# Patient Record
Sex: FEMALE | Race: WHITE | NOT HISPANIC OR LATINO | Employment: FULL TIME | URBAN - METROPOLITAN AREA
[De-identification: names, ages, dates, MRNs, and addresses within clinical notes are randomized per-mention and may not be internally consistent; named-entity substitution may affect disease eponyms.]

---

## 2018-02-15 ENCOUNTER — OFFICE VISIT (OUTPATIENT)
Dept: URGENT CARE | Facility: CLINIC | Age: 23
End: 2018-02-15
Payer: COMMERCIAL

## 2018-02-15 VITALS
HEIGHT: 63 IN | BODY MASS INDEX: 26.58 KG/M2 | OXYGEN SATURATION: 98 % | TEMPERATURE: 99 F | HEART RATE: 72 BPM | DIASTOLIC BLOOD PRESSURE: 72 MMHG | WEIGHT: 150 LBS | RESPIRATION RATE: 17 BRPM | SYSTOLIC BLOOD PRESSURE: 113 MMHG

## 2018-02-15 DIAGNOSIS — J10.1 INFLUENZA B: Primary | ICD-10-CM

## 2018-02-15 DIAGNOSIS — R68.89 FLU-LIKE SYMPTOMS: ICD-10-CM

## 2018-02-15 LAB
CTP QC/QA: YES
FLUAV AG NPH QL: NEGATIVE
FLUBV AG NPH QL: POSITIVE

## 2018-02-15 PROCEDURE — 99203 OFFICE O/P NEW LOW 30 MIN: CPT | Mod: S$GLB,,, | Performed by: NURSE PRACTITIONER

## 2018-02-15 PROCEDURE — 3008F BODY MASS INDEX DOCD: CPT | Mod: S$GLB,,, | Performed by: NURSE PRACTITIONER

## 2018-02-15 PROCEDURE — 87804 INFLUENZA ASSAY W/OPTIC: CPT | Mod: QW,S$GLB,, | Performed by: NURSE PRACTITIONER

## 2018-02-15 RX ORDER — OSELTAMIVIR PHOSPHATE 75 MG/1
75 CAPSULE ORAL 2 TIMES DAILY
Qty: 10 CAPSULE | Refills: 0 | Status: SHIPPED | OUTPATIENT
Start: 2018-02-15 | End: 2018-02-20

## 2018-02-15 NOTE — LETTER
February 15, 2018      [facility]  [facility] [address]  Phone: [phone]  Fax: [phone]       Patient: Hamlet Bear   YOB: 1995  Date of Visit: 02/15/2018    To Whom It May Concern:    Radha Bear  was at [facility] on 02/15/2018. She may return to work/school on 2/17/18 with no restrictions. If you have any questions or concerns, or if I can be of further assistance, please do not hesitate to contact me.    Sincerely,    Melvi Mabry NP

## 2018-02-15 NOTE — PROGRESS NOTES
"Subjective:       Patient ID: Hamlet Bear is a 22 y.o. female.    Vitals:  height is 5' 3" (1.6 m) and weight is 68 kg (150 lb). Her temperature is 99.1 °F (37.3 °C). Her blood pressure is 113/72 and her pulse is 72. Her respiration is 17 and oxygen saturation is 98%.     Chief Complaint: Fever (102.3 yesterday) and Cough (since yesterday)    Pt has had a fever since yesterday and took tylenol this morning to get her fever down 102.7 yesterday. Pt says she has body aches and chills along with congestion. Pt has been alternating tylenol and ibuprofen since yesterday. Pt is a local teacher. [employer]      Fever    This is a new problem. The current episode started yesterday. The problem occurs daily. The problem has been unchanged. The maximum temperature noted was 101 to 101.9 F. The temperature was taken using an oral thermometer. Associated symptoms include congestion, coughing, headaches, sleepiness and a sore throat. Pertinent negatives include no diarrhea, ear pain, nausea or vomiting. She has tried acetaminophen and NSAIDs for the symptoms. The treatment provided mild relief.   Risk factors: no contaminated food and no contaminated water    Cough   This is a new problem. The current episode started yesterday. The problem has been unchanged. The problem occurs every few minutes. The cough is productive of sputum. Associated symptoms include chills, a fever, headaches, myalgias, nasal congestion, postnasal drip and a sore throat. Pertinent negatives include no ear pain. She has tried nothing for the symptoms. The treatment provided no relief. There is no history of asthma, COPD or pneumonia.     Review of Systems   Constitution: Positive for chills, decreased appetite, fever and malaise/fatigue.   HENT: Positive for congestion, postnasal drip and sore throat. Negative for ear discharge and ear pain.    Respiratory: Positive for cough and sputum production.    Musculoskeletal: Positive for myalgias.   Gastrointestinal: " Negative for constipation, diarrhea, nausea and vomiting.   Neurological: Positive for headaches.       Objective:      Physical Exam   Constitutional: She is oriented to person, place, and time. She appears well-developed and well-nourished. She is cooperative.  Non-toxic appearance. She does not appear ill. No distress.   HENT:   Head: Normocephalic and atraumatic.   Right Ear: Hearing, tympanic membrane, external ear and ear canal normal.   Left Ear: Hearing, tympanic membrane, external ear and ear canal normal.   Nose: Mucosal edema and rhinorrhea present. No nasal deformity. No epistaxis. Right sinus exhibits no maxillary sinus tenderness and no frontal sinus tenderness. Left sinus exhibits no maxillary sinus tenderness and no frontal sinus tenderness.   Mouth/Throat: Uvula is midline and mucous membranes are normal. No trismus in the jaw. Normal dentition. No uvula swelling. Posterior oropharyngeal erythema (mild post nasal drianage noted) present. Tonsils are 1+ on the right. Tonsils are 1+ on the left.   Eyes: Conjunctivae, EOM and lids are normal. Pupils are equal, round, and reactive to light. No scleral icterus.   Sclera clear bilat   Neck: Trachea normal, normal range of motion, full passive range of motion without pain and phonation normal. Neck supple.   Cardiovascular: Normal rate, regular rhythm, normal heart sounds, intact distal pulses and normal pulses.    Pulmonary/Chest: Effort normal and breath sounds normal. No respiratory distress.   Abdominal: Soft. Normal appearance and bowel sounds are normal. She exhibits no distension. There is no tenderness.   Musculoskeletal: Normal range of motion. She exhibits no edema or deformity.   Neurological: She is alert and oriented to person, place, and time. She exhibits normal muscle tone. Coordination normal.   Skin: Skin is warm, dry and intact. She is not diaphoretic. No pallor.   Psychiatric: She has a normal mood and affect. Her speech is normal and  behavior is normal. Judgment and thought content normal. Cognition and memory are normal.   Nursing note and vitals reviewed.      Office Visit on 02/15/2018   Component Date Value Ref Range Status    Rapid Influenza A Ag 02/15/2018 Negative  Negative Final    Rapid Influenza B Ag 02/15/2018 Positive* Negative Final     Acceptable 02/15/2018 Yes   Final     Assessment:       1. Influenza B    2. Flu-like symptoms        Plan:         Influenza B  -     oseltamivir (TAMIFLU) 75 MG capsule; Take 1 capsule (75 mg total) by mouth 2 (two) times daily.  Dispense: 10 capsule; Refill: 0    Flu-like symptoms  -     POCT Influenza A/B      Patient Instructions       FLU  Your Rapid FLU test was POSITIVE FOR INFLUENZA B  If your condition worsens or fails to improve we recommend that you receive another evaluation at the ER immediately or contact your PCP to discuss your concerns or return here. You must understand that you've received an urgent care treatment only and that you may be released before all your medical problems are known or treated. You the patient will arrange for follouwp care as instructed.   -  Take full course of Tamilfu as prescribed  -  Flonase (fluticasone) is a nasal spray which is available over the counter and may help with your symptoms.   -  If you just have drainage you can take plain Zyrtec, Claritin or Allegra   -  Rest and fluids are also important.   -  Tylenol or ibuprofen can also be used as directed for pain unless you have an allergy to them or medical condition such as stomach ulcers, kidney or liver disease or blood thinners etc for which you should not be taking these type of medications.   - Do not share any utensils or share drinks   - Wash hands frequently      \

## 2018-02-15 NOTE — PATIENT INSTRUCTIONS
FLU  Your Rapid FLU test was POSITIVE FOR INFLUENZA B  If your condition worsens or fails to improve we recommend that you receive another evaluation at the ER immediately or contact your PCP to discuss your concerns or return here. You must understand that you've received an urgent care treatment only and that you may be released before all your medical problems are known or treated. You the patient will arrange for follouwp care as instructed.   -  Take full course of Tamilfu as prescribed  -  Flonase (fluticasone) is a nasal spray which is available over the counter and may help with your symptoms.   -  If you just have drainage you can take plain Zyrtec, Claritin or Allegra   -  Rest and fluids are also important.   -  Tylenol or ibuprofen can also be used as directed for pain unless you have an allergy to them or medical condition such as stomach ulcers, kidney or liver disease or blood thinners etc for which you should not be taking these type of medications.   - Do not share any utensils or share drinks   - Wash hands frequently      Influenza (Adult)    Influenza is also called the flu. It is a viral illness that affects the air passages of your lungs. It is different from the common cold. The flu can easily be passed from one to person to another. It may be spread through the air by coughing and sneezing. Or it can be spread by touching the sick person and then touching your own eyes, nose, or mouth.  The flu starts 1 to 3 days after you are exposed to the flu virus. It may last for 1 to 2 weeks but many people feel tired or fatigued for many weeks afterward. You usually dont need to take antibiotics unless you have a complication. This might be an ear or sinus infection or pneumonia.  Symptoms of the flu may be mild or severe. They can include extreme tiredness (wanting to stay in bed all day), chills, fevers, muscle aches, soreness with eye movement, headache, and a dry, hacking cough.  Home  care  Follow these guidelines when caring for yourself at home:  · Avoid being around cigarette smoke, whether yours or other peoples.  · Acetaminophen or ibuprofen will help ease your fever, muscle aches, and headache. Dont give aspirin to anyone younger than 18 who has the flu. Aspirin can harm the liver.  · Nausea and loss of appetite are common with the flu. Eat light meals. Drink 6 to 8 glasses of liquids every day. Good choices are water, sport drinks, soft drinks without caffeine, juices, tea, and soup. Extra fluids will also help loosen secretions in your nose and lungs.  · Over-the-counter cold medicines will not make the flu go away faster. But the medicines may help with coughing, sore throat, and congestion in your nose and sinuses. Dont use a decongestant if you have high blood pressure.  · Stay home until your fever has been gone for at least 24 hours without using medicine to reduce fever.  Follow-up care  Follow up with your healthcare provider, or as advised, if you are not getting better over the next week.  If you are age 65 or older, talk with your provider about getting a pneumococcal vaccine every 5 years. You should also get this vaccine if you have chronic asthma or COPD. All adults should get a flu vaccine every fall. Ask your provider about this.  When to seek medical advice  Call your healthcare provider right away if any of these occur:  · Cough with lots of colored mucus (sputum) or blood in your mucus  · Chest pain, shortness of breath, wheezing, or trouble breathing  · Severe headache, or face, neck, or ear pain  · New rash with fever  · Fever of 100.4°F (38°C) or higher, or as directed by your healthcare provider  · Confusion, behavior change, or seizure  · Severe weakness or dizziness  · You get a new fever or cough after getting better for a few days  Date Last Reviewed: 1/1/2017  © 4799-5861 [employer]. [address] [place] [address], [place] [address]. All rights  reserved. This information is not intended as a substitute for professional medical care. Always follow your healthcare professional's instructions.